# Patient Record
Sex: FEMALE | Race: WHITE | Employment: PART TIME | URBAN - METROPOLITAN AREA
[De-identification: names, ages, dates, MRNs, and addresses within clinical notes are randomized per-mention and may not be internally consistent; named-entity substitution may affect disease eponyms.]

---

## 2022-03-22 ENCOUNTER — TRANSCRIBE ORDER (OUTPATIENT)
Dept: REGISTRATION | Age: 37
End: 2022-03-22

## 2022-03-22 ENCOUNTER — HOSPITAL ENCOUNTER (OUTPATIENT)
Dept: GENERAL RADIOLOGY | Age: 37
Discharge: HOME | End: 2022-03-22
Payer: COMMERCIAL

## 2022-03-22 DIAGNOSIS — M79.675 PAIN IN TOE OF LEFT FOOT: ICD-10-CM

## 2022-03-22 DIAGNOSIS — M79.675 PAIN IN TOE OF LEFT FOOT: Primary | ICD-10-CM

## 2022-03-22 PROCEDURE — 73660 X-RAY EXAM OF TOE(S): CPT

## 2022-07-06 ENCOUNTER — TRANSCRIBE ORDER (OUTPATIENT)
Dept: REGISTRATION | Age: 37
End: 2022-07-06

## 2022-07-06 ENCOUNTER — HOSPITAL ENCOUNTER (OUTPATIENT)
Dept: GENERAL RADIOLOGY | Age: 37
Discharge: HOME | End: 2022-07-06
Payer: COMMERCIAL

## 2022-07-06 DIAGNOSIS — J44.9 OBSTRUCTIVE CHRONIC BRONCHITIS WITHOUT EXACERBATION (HCC): Primary | ICD-10-CM

## 2022-07-06 DIAGNOSIS — J44.9 OBSTRUCTIVE CHRONIC BRONCHITIS WITHOUT EXACERBATION (HCC): ICD-10-CM

## 2022-07-06 PROCEDURE — 71046 X-RAY EXAM CHEST 2 VIEWS: CPT

## 2022-07-28 ENCOUNTER — APPOINTMENT (OUTPATIENT)
Dept: CT IMAGING | Age: 37
End: 2022-07-28
Attending: PHYSICIAN ASSISTANT
Payer: COMMERCIAL

## 2022-07-28 ENCOUNTER — HOSPITAL ENCOUNTER (EMERGENCY)
Age: 37
Discharge: HOME | End: 2022-07-28
Attending: STUDENT IN AN ORGANIZED HEALTH CARE EDUCATION/TRAINING PROGRAM
Payer: COMMERCIAL

## 2022-07-28 VITALS
TEMPERATURE: 98.1 F | DIASTOLIC BLOOD PRESSURE: 72 MMHG | OXYGEN SATURATION: 100 % | WEIGHT: 138 LBS | BODY MASS INDEX: 21.66 KG/M2 | SYSTOLIC BLOOD PRESSURE: 104 MMHG | RESPIRATION RATE: 19 BRPM | HEART RATE: 59 BPM | HEIGHT: 67 IN

## 2022-07-28 DIAGNOSIS — F10.920 ALCOHOLIC INTOXICATION WITHOUT COMPLICATION (HCC): ICD-10-CM

## 2022-07-28 DIAGNOSIS — S09.90XA CLOSED HEAD INJURY, INITIAL ENCOUNTER: ICD-10-CM

## 2022-07-28 DIAGNOSIS — S01.111A EYEBROW LACERATION, RIGHT, INITIAL ENCOUNTER: Primary | ICD-10-CM

## 2022-07-28 PROCEDURE — 72125 CT NECK SPINE W/O DYE: CPT

## 2022-07-28 PROCEDURE — 75810000293 HC SIMP/SUPERF WND  RPR

## 2022-07-28 PROCEDURE — 99284 EMERGENCY DEPT VISIT MOD MDM: CPT

## 2022-07-28 PROCEDURE — 74011000250 HC RX REV CODE- 250: Performed by: PHYSICIAN ASSISTANT

## 2022-07-28 PROCEDURE — 70450 CT HEAD/BRAIN W/O DYE: CPT

## 2022-07-28 RX ADMIN — Medication 2 ML: at 21:23

## 2022-07-29 NOTE — ED PROVIDER NOTES
EMERGENCY DEPARTMENT HISTORY AND PHYSICAL EXAM    Date: 7/28/2022  Patient Name: Bianca Morales    History of Presenting Illness     Chief Complaint   Patient presents with    Laceration    Fall         History Provided By: Patient and     9:21 PM  Bianca Morales is a 30 y.o. female who presents to the emergency department C/O right forehead laceration status post fall with head injury.  states he was told by patient's friends that she was in the bathroom at a restaurant extremely intoxicated, fell and hit her head on something causing a laceration to her right eyebrow. Friends told  that she was unconscious for [place] a second. \"  Patient obviously intoxicated, review of systems limited due to this. PCP: Asaf Macdonald MD    Current Outpatient Medications   Medication Sig Dispense Refill    FLUoxetine (PROZAC) 40 mg capsule Take 40 mg by mouth daily. etonogestrel 68 mg impl by SubDERmal route daily. azithromycin (ZITHROMAX Z-JULES) 250 mg tablet Take two tablets today then one tablet daily 6 Tab 0    chlorpheniramine-HYDROcodone (TUSSIONEX PENNKINETIC ER) 10-8 mg/5 mL suspension Take 5 mL by mouth nightly as needed for Cough. Max Daily Amount: 5 mL. 60 mL 0    albuterol (PROVENTIL HFA, VENTOLIN HFA, PROAIR HFA) 90 mcg/actuation inhaler Take 2 Puffs by inhalation every six (6) hours as needed for Wheezing. 1 Inhaler 0    ibuprofen (MOTRIN) 200 mg tablet Take 200 mg by mouth every eight (8) hours as needed for Pain. acetaminophen (TYLENOL) 325 mg tablet Take 650 mg by mouth every four (4) hours as needed for Pain.          Past History     Past Medical History:  Past Medical History:   Diagnosis Date    Abnormal Pap smear     bacterial infection     Bipolar 1 disorder ([facility] [address]     not on meds during pregnancy    Essential hypertension     during last pregnancy    Other ill-defined conditions(799.89)     cyst- left ovary- has burst    Psychiatric disorder     bipolar not on meds       Past Surgical History:  Past Surgical History:   Procedure Laterality Date    HX DILATION AND CURETTAGE      HX OTHER SURGICAL      D & C 2014       Family History:  Family History   Problem Relation Age of Onset    Hypertension Mother     Crohn's Disease Father     Psychiatric Disorder Father         bipolar, PTSD    Migraines Father     Hypertension Maternal Uncle          from HTN    Psychiatric Disorder Paternal Aunt         bipolar    Cancer Paternal Grandmother         colon;     Diabetes Paternal Grandfather     Hypertension Maternal Grandmother     Alcohol abuse Maternal Grandfather      () Other        Social History:  Social History     Tobacco Use    Smoking status: Former     Packs/day: 0.50     Years: 5.00     Pack years: 2.50     Types: Cigarettes    Smokeless tobacco: Never   Substance Use Topics    Alcohol use: No     Comment: rare    Drug use: Yes     Types: Marijuana       Allergies:  No Known Allergies      Review of Systems   Review of Systems   Unable to perform ROS: Other   Constitutional:  Negative for fever. Musculoskeletal:  Negative for arthralgias and neck pain. Skin:  Positive for wound. Neurological:  Positive for syncope and headaches. All other systems reviewed and are negative. Physical Exam     Vitals:    22   BP: 104/72   Pulse: (!) 59   Resp: 19   Temp: 98.1 °F (36.7 °C)   SpO2: 100%   Weight: 62.6 kg (138 lb)   Height: 5' 7\" (1.702 m)     Physical Exam  Vital signs and nursing notes reviewed. CONSTITUTIONAL: Alert. Well-appearing; well-nourished; in no apparent distress. HEAD: Normocephalic; 2 cm linear laceration right eyebrow. No active bleeding. No bony skull depression or deformity. Negative osei sign, no raccoon eyes. EYES: PERRL; EOM's intact. No nystagmus. Conjunctiva clear. ENT: TM's normal. External ear normal. Normal nose; no rhinorrhea. Normal pharynx. Moist mucus membranes.   NECK: Supple; FROM without difficulty, non-tender. CV: Normal S1, S2; no murmurs, rubs, or gallops. No chest wall tenderness. RESPIRATORY: Normal chest excursion with respiration; breath sounds clear and equal bilaterally; no wheezes, rhonchi, or rales. GI: Non-distended; non-tender. BACK:  No evidence of trauma or deformity. Non-tender to palpation. EXT: Normal ROM in all four extremities; non-tender to palpation. SKIN: Normal for age and race; warm; dry; good turgor; no apparent lesions or exudate. NEURO: A & O x to self and place, obviously intoxicated, intermittently tearful and moans, will answer some questions          Diagnostic Study Results     Labs -   No results found for this or any previous visit (from the past 12 hour(s)). Radiologic Studies -   CT SPINE CERV WO CONT   Final Result      1. No evidence of fracture. CT HEAD WO CONT   Final Result      1. No significant abnormality. CT Results  (Last 48 hours)                 07/28/22 2144  CT SPINE CERV WO CONT Final result    Impression:      1. No evidence of fracture. Narrative:  EXAM: Cervical spine CT       CLINICAL INDICATION/HISTORY: Trauma with neck pain       COMPARISON: None. TECHNIQUE: thin section axial CT through the cervical spine. Sagittal   reconstructions were generated to evaluate spinal alignment. One or more dose reduction techniques were used on this CT: automated exposure   control, adjustment of the mAs and/or kVp according to patient size, and   iterative reconstruction techniques. The specific techniques used on this CT   exam have been documented in the patient's electronic medical record.       --------------------------------------------------------------------   FINDINGS:       SPONDYLOSIS AND ALIGNMENT: Straightening of normal cervical lordosis. Mild mid   cervical degenerative disc disease. OSSEOUS STRUCTURES: No fracture or destructive osseous lesion.        SPINAL CANAL AND NEURAL FORAMINAL ASSESSMENT:  Spinal canal not well assessed   with unenhanced CT. Disc osteophyte complex appears to mildly narrow the spinal   canal at some levels without high-grade canal stenosis. SOFT TISSUES:Unremarkable.       --------------------------------------------------------------------       07/28/22 2137  CT HEAD WO CONT Final result    Impression:      1. No significant abnormality. Narrative:  EXAM: CT of the head       INDICATION: Fall with head injury. Headache. COMPARISON: None. TECHNIQUE: Axial CT imaging of the head was performed without nonionic   intravenous contrast. Multiplanar reformats were generated. One or more dose reduction techniques were used on this CT: automated exposure   control, adjustment of the mAs and/or kVp according to patient size, and   iterative reconstruction techniques. The specific techniques used on this CT   exam have been documented in the patient's electronic medical record.       _______________       FINDINGS:       BRAIN: No evidence of intra-cranial hemorrhage or mass. CALVARIA: No fracture or suspicious bone lesion. OTHER: None.       _______________                 CXR Results  (Last 48 hours)      None            Medications given in the ED-  Medications   lidocaine/EPINEPHrine/tetracaine (LET) topical solution 2 mL (2 mL Topical Given 7/28/22 2123)         Medical Decision Making   I am the first provider for this patient. I reviewed the vital signs, available nursing notes, past medical history, past surgical history, family history and social history. Vital Signs-Reviewed the patient's vital signs.     Records Reviewed: Nursing Notes      Procedures:  Wound Repair    Date/Time: 7/28/2022 8:30 PM  Performed by: PAPreparation: skin prepped with Shur-Clens  Location details: face (right eyebrow)  Wound length:2.5 cm or less  Anesthesia: local infiltration    Anesthesia:  Local Anesthetic: lidocaine 1% without epinephrine and LET (lido, epi, tetracaine)  Anesthetic total: 1.5 mL  Debridement: none  Skin closure: Prolene (6-0)  Number of sutures: 6  Technique: simple and interrupted  My total time at bedside, performing this procedure was 16-30 minutes. ED Course:  9:21 PM   Initial assessment performed. The patients presenting problems have been discussed, and they are in agreement with the care plan formulated and outlined with them. I have encouraged them to ask questions as they arise throughout their visit. Patient placed in cervical collar as C-spine injury cannot be ruled out by exam due to intoxication. Provider Notes (Medical Decision Making): Catrachita Godfrey is a 30 y.o. female presents with right eyebrow laceration after fall while intoxicated in a restaurant prior to arrival.  Possible very brief LOC. No other obvious injuries. C-spine was immobilized with cervical collar and removed after negative CT of the head and cervical spine. Tetanus UTD. Wound was closed, patient tolerated well, was much more alert and oriented this still somewhat intoxicated and discharged home with her . Diagnosis and Disposition       DISCHARGE NOTE:    Ignacio Martinez's  results have been reviewed with her. She has been counseled regarding her diagnosis, treatment, and plan. She verbally conveys understanding and agreement of the signs, symptoms, diagnosis, treatment and prognosis and additionally agrees to follow up as discussed. She also agrees with the care-plan and conveys that all of her questions have been answered. I have also provided discharge instructions for her that include: educational information regarding their diagnosis and treatment, and list of reasons why they would want to return to the ED prior to their follow-up appointment, should her condition change. She has been provided with education for proper emergency department utilization. CLINICAL IMPRESSION:    1. Eyebrow laceration, right, initial encounter    2. Closed head injury, initial encounter    3. [facility]        PLAN:  1. D/C Home  2. Discharge Medication List as of 7/28/2022 11:17 PM        3. Follow-up Information       Follow up With Specialties Details Why Contact Info    Leatha Kwon MD Family Medicine  5-7 days, For suture removal [address] [id number]  [phone]      [facility] EMERGENCY DEPT Emergency Medicine  As needed, If symptoms worsen [address] [id number]  [phone]          _______________________________      Please note that this dictation was completed with [employer], the computer voice recognition software. Quite often unanticipated grammatical, syntax, homophones, and other interpretive errors are inadvertently transcribed by the computer software. Please disregard these errors. Please excuse any errors that have escaped final proofreading.

## 2022-07-29 NOTE — ED TRIAGE NOTES
Patient brought by  w/ laceration to right forehead.  states that patient has been drinking alcohol with friends and fell in the bathroom and hit her head.  Unknown loc

## 2023-01-31 DIAGNOSIS — J44.9 OBSTRUCTIVE CHRONIC BRONCHITIS WITHOUT EXACERBATION (HCC): Primary | ICD-10-CM

## 2023-02-01 DIAGNOSIS — J44.9 OBSTRUCTIVE CHRONIC BRONCHITIS WITHOUT EXACERBATION (HCC): Primary | ICD-10-CM

## 2023-02-03 DIAGNOSIS — J44.9 OBSTRUCTIVE CHRONIC BRONCHITIS WITHOUT EXACERBATION (HCC): Primary | ICD-10-CM
